# Patient Record
Sex: MALE | Race: WHITE | Employment: UNEMPLOYED | URBAN - METROPOLITAN AREA
[De-identification: names, ages, dates, MRNs, and addresses within clinical notes are randomized per-mention and may not be internally consistent; named-entity substitution may affect disease eponyms.]

---

## 2018-11-08 ENCOUNTER — HOSPITAL ENCOUNTER (INPATIENT)
Facility: CLINIC | Age: 53
LOS: 4 days | Discharge: HOME | End: 2018-11-12
Attending: EMERGENCY MEDICINE | Admitting: PSYCHIATRY & NEUROLOGY
Payer: COMMERCIAL

## 2018-11-08 DIAGNOSIS — F10.220 ACUTE ALCOHOLIC INTOXICATION IN ALCOHOLISM WITHOUT COMPLICATION (H): ICD-10-CM

## 2018-11-08 DIAGNOSIS — F11.10 OPIATE ABUSE, EPISODIC (H): ICD-10-CM

## 2018-11-08 PROBLEM — F10.10 ALCOHOL ABUSE: Status: ACTIVE | Noted: 2018-11-08

## 2018-11-08 LAB
ALCOHOL BREATH TEST: 0.24 (ref 0–0.01)
AMPHETAMINES UR QL SCN: NEGATIVE
BARBITURATES UR QL: NEGATIVE
BENZODIAZ UR QL: NEGATIVE
CANNABINOIDS UR QL SCN: POSITIVE
COCAINE UR QL: NEGATIVE
ETHANOL UR QL SCN: POSITIVE
GLUCOSE BLDC GLUCOMTR-MCNC: 85 MG/DL (ref 70–99)
OPIATES UR QL SCN: NEGATIVE

## 2018-11-08 PROCEDURE — 82075 ASSAY OF BREATH ETHANOL: CPT | Performed by: EMERGENCY MEDICINE

## 2018-11-08 PROCEDURE — 25000132 ZZH RX MED GY IP 250 OP 250 PS 637: Performed by: EMERGENCY MEDICINE

## 2018-11-08 PROCEDURE — 00000146 ZZHCL STATISTIC GLUCOSE BY METER IP

## 2018-11-08 PROCEDURE — HZ2ZZZZ DETOXIFICATION SERVICES FOR SUBSTANCE ABUSE TREATMENT: ICD-10-PCS | Performed by: PSYCHIATRY & NEUROLOGY

## 2018-11-08 PROCEDURE — 99285 EMERGENCY DEPT VISIT HI MDM: CPT | Performed by: EMERGENCY MEDICINE

## 2018-11-08 PROCEDURE — 80320 DRUG SCREEN QUANTALCOHOLS: CPT | Performed by: FAMILY MEDICINE

## 2018-11-08 PROCEDURE — 12800012 ZZH R&B CD MH INTERMEDIATE ADULT

## 2018-11-08 PROCEDURE — 99285 EMERGENCY DEPT VISIT HI MDM: CPT | Mod: Z6 | Performed by: EMERGENCY MEDICINE

## 2018-11-08 PROCEDURE — 80307 DRUG TEST PRSMV CHEM ANLYZR: CPT | Performed by: FAMILY MEDICINE

## 2018-11-08 RX ORDER — DIAZEPAM 5 MG
5-20 TABLET ORAL EVERY 30 MIN PRN
Status: DISCONTINUED | OUTPATIENT
Start: 2018-11-08 | End: 2018-11-12 | Stop reason: HOSPADM

## 2018-11-08 RX ORDER — HYDROXYZINE HYDROCHLORIDE 25 MG/1
25 TABLET, FILM COATED ORAL EVERY 4 HOURS PRN
Status: DISCONTINUED | OUTPATIENT
Start: 2018-11-08 | End: 2018-11-12 | Stop reason: HOSPADM

## 2018-11-08 RX ORDER — ACETAMINOPHEN 325 MG/1
650 TABLET ORAL EVERY 4 HOURS PRN
Status: DISCONTINUED | OUTPATIENT
Start: 2018-11-08 | End: 2018-11-12

## 2018-11-08 RX ORDER — LANOLIN ALCOHOL/MO/W.PET/CERES
100 CREAM (GRAM) TOPICAL ONCE
Status: COMPLETED | OUTPATIENT
Start: 2018-11-08 | End: 2018-11-08

## 2018-11-08 RX ORDER — ALUMINA, MAGNESIA, AND SIMETHICONE 2400; 2400; 240 MG/30ML; MG/30ML; MG/30ML
30 SUSPENSION ORAL EVERY 4 HOURS PRN
Status: DISCONTINUED | OUTPATIENT
Start: 2018-11-08 | End: 2018-11-12 | Stop reason: HOSPADM

## 2018-11-08 RX ORDER — BISACODYL 10 MG
10 SUPPOSITORY, RECTAL RECTAL DAILY PRN
Status: DISCONTINUED | OUTPATIENT
Start: 2018-11-08 | End: 2018-11-12 | Stop reason: HOSPADM

## 2018-11-08 RX ORDER — MAGNESIUM OXIDE 400 MG/1
800 TABLET ORAL ONCE
Status: COMPLETED | OUTPATIENT
Start: 2018-11-08 | End: 2018-11-08

## 2018-11-08 RX ORDER — MULTIVITAMIN,THERAPEUTIC
1 TABLET ORAL ONCE
Status: COMPLETED | OUTPATIENT
Start: 2018-11-08 | End: 2018-11-08

## 2018-11-08 RX ORDER — TRAZODONE HYDROCHLORIDE 50 MG/1
50 TABLET, FILM COATED ORAL
Status: DISCONTINUED | OUTPATIENT
Start: 2018-11-08 | End: 2018-11-12 | Stop reason: HOSPADM

## 2018-11-08 RX ORDER — FOLIC ACID 1 MG/1
1 TABLET ORAL ONCE
Status: COMPLETED | OUTPATIENT
Start: 2018-11-08 | End: 2018-11-08

## 2018-11-08 RX ADMIN — MAGNESIUM OXIDE TAB 400 MG (241.3 MG ELEMENTAL MG) 800 MG: 400 (241.3 MG) TAB at 18:52

## 2018-11-08 RX ADMIN — THERA TABS 1 TABLET: TAB at 18:52

## 2018-11-08 RX ADMIN — FOLIC ACID 1 MG: 1 TABLET ORAL at 18:52

## 2018-11-08 RX ADMIN — THIAMINE HCL TAB 100 MG 100 MG: 100 TAB at 18:52

## 2018-11-08 ASSESSMENT — ENCOUNTER SYMPTOMS
DIARRHEA: 0
COLOR CHANGE: 0
SORE THROAT: 0
NERVOUS/ANXIOUS: 1
VOMITING: 0
CONFUSION: 0
DIFFICULTY URINATING: 0
FEVER: 0
NAUSEA: 0
ABDOMINAL PAIN: 0
CHILLS: 0
HEADACHES: 0
EYE REDNESS: 0
SHORTNESS OF BREATH: 0

## 2018-11-08 NOTE — IP AVS SNAPSHOT
[facility]    [address] [place] [address]    Phone:  [phone]                                       After Visit Summary   11/8/2018    Esa Yoo    MRN: [id number]           After Visit Summary Signature Page     I have received my discharge instructions, and my questions have been answered. I have discussed any challenges I see with this plan with the nurse or doctor.    ..........................................................................................................................................  Patient/Patient Representative Signature      ..........................................................................................................................................  Patient Representative Print Name and Relationship to Patient    ..................................................               ................................................  Date                                   Time    ..........................................................................................................................................  Reviewed by Signature/Title    ...................................................              ..............................................  Date                                               Time          22EPIC Rev 08/18

## 2018-11-08 NOTE — IP AVS SNAPSHOT
MRN:[id number]                      After Visit Summary   11/8/2018    Esa Yoo    MRN: [id number]           Thank you!     Thank you for choosing [facility] for your care. Our goal is always to provide you with excellent care.        Patient Information     Date Of Birth          1965        Designated Caregiver       Most Recent Value    Caregiver    Will someone help with your care after discharge? no      About your hospital stay     You were admitted on:  November 8, 2018 You last received care in the:  [facility]    You were discharged on:  November 12, 2018       Who to Call     For medical emergencies, please call [phone].  For non-urgent questions about your medical care, please call your primary care provider or clinic, [phone]          Attending Provider     Provider Specialty    Jojo Pruett MD Emergency Medicine    Holli Victoria MD Psychiatry       Primary Care Provider Office Phone # Fax #    [facility] [phone]      Follow-up Appointments     Follow Up ([facility])       [facility], within 4-6 weeks for hospital follow- up.  The following labs/tests are recommended: TFTs, fasting lipids    Appointments on [place] and/or [facility] (with [facility] or [facility] provider or service). Call [phone] if you haven't heard regarding these appointments within 7 days of discharge.                  Further instructions from your care team       Behavioral Discharge Planning and Instructions  THANK YOU FOR CHOOSING THE [address]  [phone]    Summary: You were admitted to [facility] on 11/8/18 for detoxification from Alcohol.  A medical exam was performed that included lab work. You have met with a  and opted to pursue OP treatment in [facility].  You are scheduled for an intake assessment with  [facility] at the time and place indicated below.  Please take care and make your recovery a priority!      [facility],  [address] [place] [address].    November 13th @ 1:00 pm.    [phone]    Main Diagnosis:  Per  Dr. Victoria  Alcohol use disorder, severe.   Major depressive disorder, recurrent with psychotic features.           Major Treatments, Procedures and Findings:  You were detoxed from alcohol. You have met with a  to develop a treatment plan for discharge.  You have had labs drawn and a copy of those labs will be sent home with you. Your alcohol withdrawal is complete and you are being discharged today.  Please bring your lab results with to your follow up doctor appointment.  Make your recovery a priority!                        Symptoms to Report:  If you experience more anxiety, confusion, sleeplessness, deep sadness or thoughts of suicide, notify your treatment team or notify your primary care physician. IF ANY OF THE SYMPTOMS YOU ARE EXPERIENCING ARE A MEDICAL EMERGENCY CALL 911 IMMEDIATELY.     Lifestyle Adjustment: Adjust your lifestyle to get enough sleep, relaxation, exercise and  good nutrition. Continue to develop healthy coping skills to decrease stress and promote a sober living environment. Do not use alcohol, illegal drugs or addictive medications other than what is currently prescribed. AA, NA, and  Sponsor are excellent resources for support.     Disposition: [facility], [address] [place] [address].  November 13th @ 1:00 pm.  [phone]    Primary Provider:  Dr. Leonardo  [facility]  Address: [address], [place] [address]  Phone: [phone]    Appointment:  11/14/18 at 2:20pm    Resources:     [facility] [phone]    Support Group:  AA/NA and Sponsor/support    Crisis Intervention: 136.903.9764 or  684.464.1924 (TTY: 539.923.3422).  Call anytime for help.  National [place] on Mental Illness (www.[place].shirley.org): 353.319.4178 or 936-842-8361.  Alcoholics Anonymous (www.alcoholics-anonymous.org): Check your phone book for your local chapter.  Suicide Awareness Voices of Education (SAVE) (www.save.org): 719-069-UBRK (8447)  National Suicide Prevention Line (www.mentalhealthmn.org): 163-598-GTEF (1649)  Mental Health Consumer/Survivor Network of [place] (www.mhcsn.net): 530.592.9552 or 866-083-7540  Mental Health Association of [place] (www.mentalhealth.org): 874.270.2197 or 143-283-0022   Substance Abuse and Mental Health Services (www.samhsa.gov)    [facility] ([facility])  [facility] connects people seeking recovery to resources that help foster and sustain long-term recovery.  Whether you are seeking resources for treatment, transportation, housing, job training, education, health care or other pathways to recovery, [facility] is a great place to start.  902.314.1095.  Www.[facility].org    General Medication Instructions:   See your medication sheet(s) for instructions.   Take all medicines as directed.  Make no changes unless your doctor suggests them.   Go to all your doctor visits.  Be sure to have all your required lab tests. This way, your medicines can be refilled on time.  Do not use any drugs not prescribed by your provider.  AA/NA and Sponsors are excellent resources for support  Avoid alcohol.    Please Note:  If you have any questions at anytime after you are discharged please call the [facility] [place] detox unit 3AW unit at [phone].    [facility], Behavioral Intake [phone]    Please take this discharge folder with you to all your follow up appointments, it contains your lab results, diagnosis, medication list and discharge recommendations.      THANK YOU FOR CHOOSING THE [facility]     Pending Results     Date and Time  "Order Name Status Description    2018 0812 Hepatitis C antibody In process     2018 0812 Hepatitis B surface antigen In process     2018 0812 Hepatitis B Surface Antibody In process     2018 0810 T4 free In process             Statement of Approval     Ordered          18 0747  I have reviewed and agree with all the recommendations and orders detailed in this document.  EFFECTIVE NOW     Comments:  Please ask medcine about lft and simvastatin   Approved and electronically signed by:  Holli Victoria MD             Admission Information     Date & Time Provider Department Dept. Phone    2018 Holli Victoria MD [facility] [phone]      Your Vitals Were     Blood Pressure Pulse Temperature Respirations Height Weight    115/76 79 97.5  F (36.4  C) (Oral) 16 1.778 m (5' 10\") 99.2 kg (218 lb 9.6 oz)    Pulse Oximetry BMI (Body Mass Index)                96% 31.37 kg/m2          [facility] Information     [facility] lets you send messages to your doctor, view your test results, renew your prescriptions, schedule appointments and more. To sign up, go to [email or web address] . Click on \"Log in\" on the left side of the screen, which will take you to the Welcome page. Then click on \"Sign up Now\" on the right side of the page.     You will be asked to enter the access code listed below, as well as some personal information. Please follow the directions to create your username and password.     Your access code is: [email or web address]  Expires: 2/10/2019  9:16 AM     Your access code will  in 90 days. If you need help or a new code, please call your [facility] clinic or [phone].        Care EveryWhere ID     This is your Care EveryWhere ID. This could be used by other organizations to access your [facility] medical records  [id number]        Equal Access to Services     ALEX FRASER AH: Terrie Rod, james guillory, marco a saavedra, " mindy morgan haynathalyn napoleon khjesssh la'aan ah. So [facility] [phone].    ATENCIÓN: Si habla margiañol, tiene a smith disposición servicios gratuitos de asistencia lingüística. Llcatie al [phone].    We comply with applicable federal civil rights laws and [place] laws. We do not discriminate on the basis of race, color, national origin, age, disability, sex, sexual orientation, or gender identity.               Review of your medicines      START taking        Dose / Directions    fluticasone 50 MCG/ACT spray   Commonly known as:  FLONASE   Used for:  Acute alcoholic intoxication in alcoholism without complication (H)        Dose:  1 spray   Spray 1 spray into both nostrils daily   Quantity:  1 Bottle   Refills:  0       hydrOXYzine 25 MG tablet   Commonly known as:  ATARAX   Used for:  Acute alcoholic intoxication in alcoholism without complication (H)        Dose:  25 mg   Take 1 tablet (25 mg) by mouth every 4 hours as needed for anxiety   Quantity:  120 tablet   Refills:  0       lisinopril-hydrochlorothiazide 10-12.5 MG per tablet   Commonly known as:  PRINZIDE/ZESTORETIC   Used for:  Acute alcoholic intoxication in alcoholism without complication (H)        Dose:  1 tablet   Take 1 tablet by mouth daily   Quantity:  30 tablet   Refills:  0       multivitamin, therapeutic Tabs tablet   Used for:  Acute alcoholic intoxication in alcoholism without complication (H)        Dose:  1 tablet   Take 1 tablet by mouth daily   Quantity:  30 tablet   Refills:  0       nicotine 21 MG/24HR 24 hr patch   Commonly known as:  NICODERM CQ   Used for:  Acute alcoholic intoxication in alcoholism without complication (H)        Dose:  1 patch   Place 1 patch onto the skin daily   Quantity:  30 patch   Refills:  1       thiamine 100 MG tablet   Used for:  Acute alcoholic intoxication in alcoholism without complication (H)        Dose:  100 mg   Take 1 tablet (100 mg) by mouth daily   Quantity:  30 tablet   Refills:  0            Where  to get your medicines      These medications were sent to [facility] [place], [place] - [address]  [address] [facility], [facility] [address]     Phone:  [phone]     fluticasone 50 MCG/ACT spray    hydrOXYzine 25 MG tablet    lisinopril-hydrochlorothiazide 10-12.5 MG per tablet    multivitamin, therapeutic Tabs tablet    nicotine 21 MG/24HR 24 hr patch    thiamine 100 MG tablet                Protect others around you: Learn how to safely use, store and throw away your medicines at www.disposemymeds.org.             Medication List: This is a list of all your medications and when to take them. Check marks below indicate your daily home schedule. Keep this list as a reference.      Medications           Morning Afternoon Evening Bedtime As Needed    fluticasone 50 MCG/ACT spray   Commonly known as:  FLONASE   Spray 1 spray into both nostrils daily   Last time this was given:  1 spray on 11/12/2018  9:16 AM                                   hydrOXYzine 25 MG tablet   Commonly known as:  ATARAX   Take 1 tablet (25 mg) by mouth every 4 hours as needed for anxiety   Last time this was given:  25 mg on 11/12/2018 12:13 PM                                   lisinopril-hydrochlorothiazide 10-12.5 MG per tablet   Commonly known as:  PRINZIDE/ZESTORETIC   Take 1 tablet by mouth daily   Last time this was given:  1 tablet on 11/12/2018  9:09 AM                                   multivitamin, therapeutic Tabs tablet   Take 1 tablet by mouth daily   Last time this was given:  1 tablet on 11/12/2018  9:08 AM                                   nicotine 21 MG/24HR 24 hr patch   Commonly known as:  NICODERM CQ   Place 1 patch onto the skin daily   Last time this was given:  1 patch on 11/12/2018  9:09 AM                                   thiamine 100 MG tablet   Take 1 tablet (100 mg) by mouth daily   Last time this was given:  100 mg on 11/12/2018  9:09 AM

## 2018-11-09 ENCOUNTER — APPOINTMENT (OUTPATIENT)
Dept: ULTRASOUND IMAGING | Facility: CLINIC | Age: 53
End: 2018-11-09
Attending: PHYSICIAN ASSISTANT
Payer: COMMERCIAL

## 2018-11-09 LAB
ALBUMIN SERPL-MCNC: 3.8 G/DL (ref 3.4–5)
ALP SERPL-CCNC: 72 U/L (ref 40–150)
ALT SERPL W P-5'-P-CCNC: 137 U/L (ref 0–70)
ANION GAP SERPL CALCULATED.3IONS-SCNC: 7 MMOL/L (ref 3–14)
AST SERPL W P-5'-P-CCNC: 133 U/L (ref 0–45)
BASOPHILS # BLD AUTO: 0 10E9/L (ref 0–0.2)
BASOPHILS NFR BLD AUTO: 0.4 %
BILIRUB SERPL-MCNC: 0.9 MG/DL (ref 0.2–1.3)
BUN SERPL-MCNC: 12 MG/DL (ref 7–30)
CALCIUM SERPL-MCNC: 8.7 MG/DL (ref 8.5–10.1)
CHLORIDE SERPL-SCNC: 106 MMOL/L (ref 94–109)
CHOLEST SERPL-MCNC: 236 MG/DL
CO2 SERPL-SCNC: 28 MMOL/L (ref 20–32)
CREAT SERPL-MCNC: 0.86 MG/DL (ref 0.66–1.25)
DIFFERENTIAL METHOD BLD: ABNORMAL
EOSINOPHIL # BLD AUTO: 0.1 10E9/L (ref 0–0.7)
EOSINOPHIL NFR BLD AUTO: 1 %
ERYTHROCYTE [DISTWIDTH] IN BLOOD BY AUTOMATED COUNT: 14.3 % (ref 10–15)
GFR SERPL CREATININE-BSD FRML MDRD: >90 ML/MIN/1.7M2
GLUCOSE SERPL-MCNC: 82 MG/DL (ref 70–99)
HCT VFR BLD AUTO: 46.7 % (ref 40–53)
HDLC SERPL-MCNC: 107 MG/DL
HGB BLD-MCNC: 15.7 G/DL (ref 13.3–17.7)
IMM GRANULOCYTES # BLD: 0 10E9/L (ref 0–0.4)
IMM GRANULOCYTES NFR BLD: 0.3 %
LDLC SERPL CALC-MCNC: 108 MG/DL
LYMPHOCYTES # BLD AUTO: 3.5 10E9/L (ref 0.8–5.3)
LYMPHOCYTES NFR BLD AUTO: 33.3 %
MCH RBC QN AUTO: 34.5 PG (ref 26.5–33)
MCHC RBC AUTO-ENTMCNC: 33.6 G/DL (ref 31.5–36.5)
MCV RBC AUTO: 103 FL (ref 78–100)
MONOCYTES # BLD AUTO: 1 10E9/L (ref 0–1.3)
MONOCYTES NFR BLD AUTO: 9.7 %
NEUTROPHILS # BLD AUTO: 5.8 10E9/L (ref 1.6–8.3)
NEUTROPHILS NFR BLD AUTO: 55.3 %
NONHDLC SERPL-MCNC: 129 MG/DL
NRBC # BLD AUTO: 0 10*3/UL
NRBC BLD AUTO-RTO: 0 /100
PLATELET # BLD AUTO: 186 10E9/L (ref 150–450)
POTASSIUM SERPL-SCNC: 4.2 MMOL/L (ref 3.4–5.3)
PROT SERPL-MCNC: 7.2 G/DL (ref 6.8–8.8)
RBC # BLD AUTO: 4.55 10E12/L (ref 4.4–5.9)
SODIUM SERPL-SCNC: 141 MMOL/L (ref 133–144)
T4 FREE SERPL-MCNC: 0.95 NG/DL (ref 0.76–1.46)
TRIGL SERPL-MCNC: 105 MG/DL
TSH SERPL DL<=0.005 MIU/L-ACNC: 4.1 MU/L (ref 0.4–4)
WBC # BLD AUTO: 10.5 10E9/L (ref 4–11)

## 2018-11-09 PROCEDURE — 99222 1ST HOSP IP/OBS MODERATE 55: CPT | Performed by: PHYSICIAN ASSISTANT

## 2018-11-09 PROCEDURE — 12800012 ZZH R&B CD MH INTERMEDIATE ADULT

## 2018-11-09 PROCEDURE — 99207 ZZC CONSULT E&M CHANGED TO INITIAL LEVEL: CPT | Performed by: PHYSICIAN ASSISTANT

## 2018-11-09 PROCEDURE — 36415 COLL VENOUS BLD VENIPUNCTURE: CPT | Performed by: PSYCHIATRY & NEUROLOGY

## 2018-11-09 PROCEDURE — 25000132 ZZH RX MED GY IP 250 OP 250 PS 637: Performed by: PSYCHIATRY & NEUROLOGY

## 2018-11-09 PROCEDURE — 84443 ASSAY THYROID STIM HORMONE: CPT | Performed by: PSYCHIATRY & NEUROLOGY

## 2018-11-09 PROCEDURE — 93971 EXTREMITY STUDY: CPT | Mod: LT

## 2018-11-09 PROCEDURE — 80053 COMPREHEN METABOLIC PANEL: CPT | Performed by: PSYCHIATRY & NEUROLOGY

## 2018-11-09 PROCEDURE — 85025 COMPLETE CBC W/AUTO DIFF WBC: CPT | Performed by: PSYCHIATRY & NEUROLOGY

## 2018-11-09 PROCEDURE — 80061 LIPID PANEL: CPT | Performed by: PSYCHIATRY & NEUROLOGY

## 2018-11-09 PROCEDURE — 25000132 ZZH RX MED GY IP 250 OP 250 PS 637: Performed by: NURSE PRACTITIONER

## 2018-11-09 PROCEDURE — 99221 1ST HOSP IP/OBS SF/LOW 40: CPT | Mod: AI | Performed by: PSYCHIATRY & NEUROLOGY

## 2018-11-09 PROCEDURE — 84439 ASSAY OF FREE THYROXINE: CPT | Performed by: PSYCHIATRY & NEUROLOGY

## 2018-11-09 RX ORDER — MULTIVITAMIN,THERAPEUTIC
1 TABLET ORAL DAILY
Status: DISCONTINUED | OUTPATIENT
Start: 2018-11-09 | End: 2018-11-12 | Stop reason: HOSPADM

## 2018-11-09 RX ORDER — LANOLIN ALCOHOL/MO/W.PET/CERES
100 CREAM (GRAM) TOPICAL DAILY
Status: DISCONTINUED | OUTPATIENT
Start: 2018-11-09 | End: 2018-11-12 | Stop reason: HOSPADM

## 2018-11-09 RX ORDER — LISINOPRIL/HYDROCHLOROTHIAZIDE 10-12.5 MG
1 TABLET ORAL DAILY
Status: DISCONTINUED | OUTPATIENT
Start: 2018-11-09 | End: 2018-11-12 | Stop reason: HOSPADM

## 2018-11-09 RX ORDER — FLUTICASONE PROPIONATE 50 MCG
1 SPRAY, SUSPENSION (ML) NASAL DAILY
Status: DISCONTINUED | OUTPATIENT
Start: 2018-11-09 | End: 2018-11-12 | Stop reason: HOSPADM

## 2018-11-09 RX ORDER — ATORVASTATIN CALCIUM 40 MG/1
40 TABLET, FILM COATED ORAL EVERY EVENING
Status: DISCONTINUED | OUTPATIENT
Start: 2018-11-09 | End: 2018-11-12

## 2018-11-09 RX ORDER — FOLIC ACID 1 MG/1
1 TABLET ORAL DAILY
Status: DISCONTINUED | OUTPATIENT
Start: 2018-11-09 | End: 2018-11-12 | Stop reason: HOSPADM

## 2018-11-09 RX ADMIN — ATORVASTATIN CALCIUM 40 MG: 40 TABLET, FILM COATED ORAL at 20:36

## 2018-11-09 RX ADMIN — FLUTICASONE PROPIONATE 1 SPRAY: 50 SPRAY, METERED NASAL at 14:26

## 2018-11-09 RX ADMIN — FOLIC ACID 1 MG: 1 TABLET ORAL at 13:20

## 2018-11-09 RX ADMIN — THERA TABS 1 TABLET: TAB at 13:20

## 2018-11-09 RX ADMIN — DIAZEPAM 10 MG: 5 TABLET ORAL at 20:36

## 2018-11-09 RX ADMIN — NICOTINE 1 PATCH: 7 PATCH TRANSDERMAL at 11:02

## 2018-11-09 RX ADMIN — Medication 100 MG: at 13:20

## 2018-11-09 RX ADMIN — DIAZEPAM 10 MG: 5 TABLET ORAL at 00:36

## 2018-11-09 RX ADMIN — DIAZEPAM 10 MG: 5 TABLET ORAL at 13:20

## 2018-11-09 RX ADMIN — DIAZEPAM 5 MG: 5 TABLET ORAL at 16:16

## 2018-11-09 RX ADMIN — DIAZEPAM 10 MG: 5 TABLET ORAL at 11:02

## 2018-11-09 RX ADMIN — DIAZEPAM 10 MG: 5 TABLET ORAL at 09:41

## 2018-11-09 RX ADMIN — LISINOPRIL AND HYDROCHLOROTHIAZIDE 1 TABLET: 12.5; 1 TABLET ORAL at 14:27

## 2018-11-09 RX ADMIN — DIAZEPAM 10 MG: 5 TABLET ORAL at 04:52

## 2018-11-09 ASSESSMENT — ACTIVITIES OF DAILY LIVING (ADL)
DRESS: INDEPENDENT
ORAL_HYGIENE: INDEPENDENT
GROOMING: SHOWER;INDEPENDENT
LAUNDRY: WITH SUPERVISION

## 2018-11-09 NOTE — PROGRESS NOTES
Writer met with pt to discuss discharge planning. At this time he is undecided about treatment. Writer encouraged him to complete the paperwork and we discussed LP. He has had two prior treatments at [facility] in 2014 and 2016.     Pt has Blue Plus MA.    He lives with his GF who is on suboxone. He owns his home but finances are tough right now. He plans on selling his home and moving in with his son in [place]. Son smokes pot at times and drinks occasionally. 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 He has not worked for a year and a half.

## 2018-11-09 NOTE — PLAN OF CARE
Problem: Substance Withdrawal  Goal: Substance Withdrawal  Signs and symptoms of listed problems will be absent or manageable.   Outcome: No Change  Pt being monitored for alcohol withdrawal. Pt medicated x 3 with valium 10 mg. Pt has +hand/arm tremors. No oversedation noted. Pt out on unit. Showered. See order for pt to use c pap machine. Pt called GF and got name and doses of his home medications which were ordered by Dr. Victoria.   Pt went down for US of leg related to edema. Ticket to ride and code two ordered obtained. Pt reports mood as anxious. To have a hepatic panel in am. Discharge plans pending.

## 2018-11-09 NOTE — PHARMACY-ADMISSION MEDICATION HISTORY
"Admission Medication History status for the 11/8/2018 admission is complete.  See EPIC admission navigator for Prior to Admission medications.    Medication history interview sources:  Patient and Spouse     Medication history source reliability: Good    Patient reports taking no prescription or over-the-counter medications, reports was prescribed a cholesterol med and anti-depressant but hasn't taken anything \"for months\".     Has not received a flu shot.     Time spent in this activity: 5 minutes    Medication history completed by: HIRAM Betts    Prior to Admission medications    Not on File         "

## 2018-11-09 NOTE — PLAN OF CARE
Problem: Patient Care Overview  Goal: Team Discussion  Team Plan:   BEHAVIORAL TEAM DISCUSSION    Participants: Dr. Victoria, 3a  and 3a nurses  Progress: new admission  Continued Stay Criteria/Rationale: pt is withdrawing from alcohol-on the MSSA scale for withdrawal  Medical/Physical: withdrawing, receiving valium   Precautions:   Behavioral Orders   Procedures     Code 1 - Restrict to Unit     Routine Programming     As clinically indicated     Status 15     Every 15 minutes.     Withdrawal precautions     Plan: pt will complete paperwork and decide on treatment this weekend.  Rationale for change in precautions or plan: no change

## 2018-11-09 NOTE — H&P
Admitted:     11/08/2018      IDENTIFYING INFORMATION:  The patient is a 52-year-old  male.  He is a banker.  He is single, currently unemployed.  He has a girlfriend and has 2 children.      CHIEF COMPLAINT:  Alcohol.      HISTORY OF PRESENT ILLNESS:  The patient started drinking in late May.  He has chronic alcoholism.  He started in his late 20s.  He went to [facility] in 2014 and was sober through 2016.  In 2016, he was sober until May of this year.  He has been drinking daily.  He has been drinking half to 3/4 liters   of tequila.  He has tolerance, withdrawal, and progressive use.  He spends more time, more amount used despite negative consequences, money, job, relationships.  He is using marijuana off and on.  He told the emergency room doctor and the admitting nurse that he was using opiates, but he denies using opiates right now that he just was told by his friend to tell that he was using opiates to make sure he gets in, but he does not use any opiates.  He smokes cigars.  He does not de la garza.  He does have depression.  He agrees to take Wellbutrin.  He did have depression when he was sober.  When he gets depressed, his sleep goes down, energy goes down, motivation goes down, interest goes down, and he isolates.  He does not have suicidal ideation.  Does not have auditory hallucinations.      PAST PSYCHIATRIC HISTORY:  .  Last hospitalization was in [facility].      REVIEW OF SYSTEMS:  A 10-point system was reviewed and is negative.  He is very tremulous.  His vitals show a temperature of 99, pulse of 90, respiration rate of 16, blood pressure of 144/94.  The patient has increased lipids.      FAMILY HISTORY:  Maternal grandmother has alcoholism.  Dad has depression.  Paternal uncle committed suicide by carbon monoxide poisoning.      SOCIAL HISTORY:  Born in [place].  Good childhood up until age 14 when parents , and he was left alone.  He began to drink.  He was a single dad for a while  and after his daughter left, he began to drink more.      MENTAL STATUS EXAMINATION:  The patient is a 52-year-old  male who appears his stated age.  Adequate grooming, adequate hygiene.  Maintains good eye contact.  Cooperative.  No psychomotor abnormalities; however, he is tremulous.  Gait is normal.  Mood is tired.  Affect is congruent.  Speech is spontaneous, normal rate.    Does not have any active suicidal ideation.  Denied auditory hallucinations.  Alert and oriented x 3.  No loose associations.      DIAGNOSES:   1.  Alcohol use disorder, severe.   2.  Major depressive disorder, recurrent with psychotic features.      PLAN:  The patient will be detoxed off alcohol using [facility] protocol on Valium.  The patient will be started on Wellbutrin after detox is done.  The patient is ambivalent what he wants to do in terms of treatment.  The patient will be seen by case management and Internal Medicine.         KY HOUSER MD             D: 2018   T: 2018   MT:       Name:     YOMAIRA VERA   MRN:      [id number]        Account:      [id number]   :      1965        Admitted:     2018                   Document: [id number]

## 2018-11-09 NOTE — CONSULTS
"  [facility]  Internal Medicine Consult     Esa Yoo MRN# [id number]   Age: 52 year old YOB: 1965     Date of Admission: 11/8/2018  Date of Consult:  11/9/2018    Primary Care Provider: Cari Cheema    Requesting Service: Dr. Victoria  Reason for Consult: General Medical Evaluation      SUBJECTIVE   CC:   Elevated BP   Assessment and Plan/Recommendations:   Esa Yoo is a 52 year old male with history of depression, anxiety, and substance abuse who was admitted to station 3A with acute etoh withdrawal    Substance abuse with acute etoh withdrawal: Drinking 375 to 400 ml vodka tequila daily. Also using marijuana and opiates   - Management per psych     Elevated blood pressure: BP 120s-150s/80s-90s in the setting of acute withdrawal  - Hydralazine prn for SBP>175 or DBP>110  - Contact medicine if BP spikes >175/>110 or is consistently >150s/>90s upon completion of detox    Transaminitis: , . Tbili and ALP normal. LFTs normal 5/2018 at [facility]. Reports intermittent RUQ pain. No tenderness on exam. Likely 2/2 etoh although not in classic 2:1 ratio  - Trend LFTs 11/10 given new elevation, not 2:1    LLE edema: Present for \"Several months.\" Denies prior US. Concerning for DVT   - LLE US    Abnormal TFTs: TSH 4.1, T4 normal. Likely sick euthyroid vs subclinical hypothyroidism. Recheck with PCP in 6 weeks    Hypercholesteremia: Ordered per psych. Discussed lifestyle modifications and follow up with PCP     ALINE: Continue CPAP    Medicine will continue to peripherally follow. Please contact if future questions or concerns arise. Thank you for the opportunity to be a part of this patient's care.      Katelin Shetty  Internal Medicine [facility] Hospitalist  [phone]  November 9, 2018         HPI:   Esa Yoo is a 52 year old male with history of depression, anxiety, and substance abuse who was admitted to station 3A " "with acute etoh withdrawal    Patient currently reports feeling very shaky. He is constipated but thinks he will probably have a BM today. He is tolerating a diet. He reports intermittent RUQ tenderness but is unsure if present at this time. Denies icterus, jaundice, fevers, confusion. Patient stopped following with his PCP a few months back for unclear reasons. He is agreeable to OP follow up going forward. Denies recent illness, headache, confusion, acute visual changes, dizziness, chest pain, dyspnea, cough, urinary symptoms, peripheral edema, new onset joint pain, or rash       Past Medical History:     Past Medical History:   Diagnosis Date     Anxiety      Depressive disorder      Hypertension      Polysubstance abuse (H)      Sleep apnea         Reviewed and updated in [facility].     Past Surgical History:      Past Surgical History:   Procedure Laterality Date     HERNIA REPAIR           Social History:   Tobacco use: 5 cigars/day  Alcohol use: As above  Elicit drug use: As above     Family History:     Family History   Problem Relation Age of Onset     Hypertension Mother      Coronary Artery Disease Father      Cancer Father          Allergies:   No Known Allergies      Medications:   Reviewed. Please see MAR     Review of Systems:   10 point ROS of systems including Constitutional, Eyes, Respiratory, Cardiovascular, Gastroenterology, Genitourinary, Integumentary, Muscularskeletal, Psychiatric were all negative except for pertinent positives noted in my HPI.    OBJECTIVE   Physical Exam:   Vitals were reviewed  Blood pressure (!) 144/94, pulse 90, temperature 99  F (37.2  C), temperature source Oral, resp. rate 16, height 1.778 m (5' 10\"), weight 99.2 kg (218 lb 9.6 oz), SpO2 96 %.  General: Alert, NAD, appears anxious. Shaky  HEENT: Anicteric sclera, EOMI, membranes moist  Cardiovascular: RRR, S1S2. No murmur noted  Lungs: CTAB without wheezing or crackles   GI: Abdomen soft, non-tender with bowel sounds " present. No guarding or rebound present  Vascular: LLE 2+ peripheral edema to the mid shins. No RLE edema, distal pulses palpable  Neurologic: AAO X 3, no focal deficits  Neuropsychiatric: Per psych  Skin: No jaundice, rashes, or lesions        Data:        No results found for: NA No results found for: CHLORIDE No results found for: BUN   No results found for: POTASSIUM No results found for: CO2 No results found for: CR     Lab Results   Component Value Date    WBC 10.5 11/09/2018    HGB 15.7 11/09/2018    HCT 46.7 11/09/2018     (H) 11/09/2018     11/09/2018     Lab Results   Component Value Date    WBC 10.5 11/09/2018

## 2018-11-09 NOTE — PLAN OF CARE
"Problem: Substance Withdrawal  Goal: Substance Withdrawal  Signs and symptoms of listed problems will be absent or manageable.     ADMIT: this 51 yo arrived tonight around 2100. Pt reports drinking 1/2 to 3/4 of a 750 ml bottle of tequila daily since last May when he went to visit his mother in [place].  Last reported drink was just prior to arrival in the ED.  Breathalyzer .237.  No reported hx of DTs or seizures.  Pt also reports smoking marijuana daily.  Pt reports using 1 to 2 10 mg tablets of oxy daily but admits this was probably less and it wouldn't be an issue. \"Alcohol is my biggest problem\"!  Last reported use was yesterday.  No reported withdrawal symptoms at this time.  Pt reports passive SI but no plan or intent. Pt states because he hasn't worked and is just about \"broke\" so he has some mild depression because of his situation and issues. Pt denies SI now and states he would like to detox and then possibly [place] again as he has been there twice for treatment in the past. Pt has settled well on the unit at this time.  MSSA 4 at 2130. Opiate score 3 but pt states he does not abuse oxy.         "

## 2018-11-09 NOTE — PROGRESS NOTES
Contact medicine if BP spikes >175/>110 or is consistently >150s/>90s upon completion of detox      Katelin Shetty PA-C     Per Medical

## 2018-11-09 NOTE — ED NOTES
ED to Behavioral Floor Handoff    SITUATION  Esa Yoo is a 52 year old male who speaks English and lives in a home with erica. The patient arrived in the ED by private car from home with a complaint of Addiction Problem (Seeking detox from alchohol and opiates. Last use today.)  .The patient's current symptoms started/worsened 1 month(s) ago and during this time the symptoms have increased.   In the ED, pt was diagnosed with   Final diagnoses:   Acute alcoholic intoxication in alcoholism without complication (H)   Opiate abuse, episodic (H)        Initial vitals were: BP: 126/80  Pulse: 93  Temp: 97.4  F (36.3  C)  Resp: 16  Weight: 99.2 kg (218 lb 9.6 oz)  SpO2: 96 %   --------  Is the patient diabetic? No   If yes, last blood glucose? --     If yes, was this treated in the ED? --  --------  Is the patient inebriated (ETOH) Yes or Impaired on other substances? No  MSSA done? Yes  Last MSSA score: --    Were withdrawal symptoms treated? Yes  Does the patient have a seizure history? No. If yes, date of most recent seizure--  --------  Is the patient patient experiencing suicidal ideation? denies current or recent suicidal ideation     Homicidal ideation? denies current or recent homicidal ideation or behaviors.    Self-injurious behavior/urges? denies current or recent self injurious behavior or ideation.  ------  Was pt aggressive in the ED No  Was a code called No  Is the pt now cooperative? Yes  -------  Meds given in ED:   Medications   multivitamin, therapeutic (THERA-VIT) tablet 1 tablet (1 tablet Oral Given 11/8/18 1852)   folic acid (FOLVITE) tablet 1 mg (1 mg Oral Given 11/8/18 1852)   thiamine tablet 100 mg (100 mg Oral Given 11/8/18 1852)   magnesium oxide (MAG-OX) tablet 800 mg (800 mg Oral Given 11/8/18 1852)      Family present during ED course? Yes  Family currently present? Yes    BACKGROUND  Does the patient have a cognitive impairment or developmental disability? No  Allergies: No  Known Allergies.   Social demographics are   Social History     Social History     Marital status:      Spouse name: N/A     Number of children: N/A     Years of education: N/A     Social History Main Topics     Smoking status: Current Every Day Smoker     Smokeless tobacco: Never Used     Alcohol use Yes      Comment: 750 ml Tequila/day     Drug use: Yes     Special: Opiates      Comment: oxy     Sexual activity: Not Asked     Other Topics Concern     None     Social History Narrative     None        ASSESSMENT  Labs results   Labs Ordered and Resulted from Time of ED Arrival Up to the Time of Departure from the ED   DRUG ABUSE SCREEN 6 CHEM DEP URINE ([facility]) - Abnormal; Notable for the following:        Result Value    Cannabinoids Qual Urine Positive (*)     Ethanol Qual Urine Positive (*)     All other components within normal limits   ALCOHOL BREATH TEST POCT - Abnormal; Notable for the following:     Alcohol Breath Test 0.237 (*)     All other components within normal limits   GLUCOSE MONITOR NURSING POCT   GLUCOSE BY METER      Imaging Studies: No results found for this or any previous visit (from the past 24 hour(s)).   Most recent vital signs /80  Pulse 93  Temp 97.4  F (36.3  C) (Oral)  Resp 16  Wt 99.2 kg (218 lb 9.6 oz)  SpO2 96%   Abnormal labs/tests/findings requiring intervention:---   Pain control: fair  Nausea control: fair    RECOMMENDATION  Are any infection precautions needed (MRSA, VRE, etc.)? No If yes, what infection? --  ---  Does the patient have mobility issues? independently. If yes, what device does the pt use? ---  ---  Is patient on 72 hour hold or commitment? No If on 72 hour hold, have hold and rights been given to patient? Yes  Are admitting orders written if after 10 p.m. N/A  Tasks needing to be completed:---     Audrey Gurrola   [facility]-- [address]   [phone] West ED   [phone] East ED

## 2018-11-09 NOTE — ED PROVIDER NOTES
"  History     Chief Complaint   Patient presents with     Addiction Problem     Seeking detox from alchohol and opiates. Last use today.     The history is provided by the patient and the spouse.     Esa Yoo is a 52 year old male who presents to the emergency department today requesting detox.  Patient states that he has been drinking 1/2-3/4 of a 750 mL bottle of tequila daily since May.  Last use was just prior to arrival in the emergency department.  The patient reports that he has never had an alcohol withdrawal seizure to his knowledge.  He does endorse withdrawal symptoms including shakes, sweats, severe anxiety and panic attacks, diarrhea, as well as nausea and vomiting.    In the past 3 months he has been abusing opiates.  He is taking 1 or 2 10 mg tablets of oxycodone every day to every other day.  Last use of opiates was yesterday.    He also admits to smoking marijuana on a daily basis for 20 years.  He states he is unable to quantify how much he uses.    He has been in treatment in [facility] in 2014 and 2016.  He endorses depression and anxiety, states that previously he was treated for this through his clinic until \"my doctor  \".  This was in 2016.  He has been very inconsistent with taking his prescribed Prozac since that time.  He denies any current SI but does endorse passive SI.    I have reviewed the Medications, Allergies, Past Medical and Surgical History, and Social History in the Epic system.    Review of Systems   Constitutional: Negative for chills and fever.   HENT: Negative for congestion and sore throat.    Eyes: Negative for redness.   Respiratory: Negative for shortness of breath.    Cardiovascular: Negative for chest pain.   Gastrointestinal: Negative for abdominal pain, diarrhea, nausea and vomiting.   Genitourinary: Negative for difficulty urinating.   Skin: Negative for color change.   Neurological: Negative for headaches.   Psychiatric/Behavioral: Positive for " suicidal ideas (passive SI, no active SI, no plan). Negative for confusion. The patient is nervous/anxious.    All other systems reviewed and are negative.      Physical Exam   BP: 126/80  Pulse: 93  Temp: 97.4  F (36.3  C)  Resp: 16  Weight: 99.2 kg (218 lb 9.6 oz)  SpO2: 96 %      Physical Exam   Constitutional: No distress.   Middle aged male, lying back in bed, sleeping.  Arousable. NAD   HENT:   Head: Normocephalic and atraumatic.   Mouth/Throat: Oropharynx is clear and moist. No oropharyngeal exudate.   Eyes: No scleral icterus.   Cardiovascular: Normal heart sounds and intact distal pulses.    Pulmonary/Chest: Breath sounds normal. No respiratory distress.   Abdominal: Soft. Bowel sounds are normal. He exhibits no distension. There is no tenderness. There is no rebound.   Obese, soft, nontender.  Ventral hernia noted, soft, nontender.    Musculoskeletal: He exhibits no edema or tenderness.   Skin: Skin is warm. No rash noted. He is not diaphoretic.   Psychiatric:   Disheveled.  Intoxicated. Cooperative, fair eye contact. Slightly delayed answers to questions.  Depressed affect. Passive SI without active plan.  No AH/VH.    Nursing note and vitals reviewed.      ED Course     ED Course     Procedures             Critical Care time:  none             Results for orders placed or performed during the hospital encounter of 11/08/18 (from the past 48 hour(s))   Alcohol breath test POCT   Result Value Ref Range    Alcohol Breath Test 0.237 (A) 0.00 - 0.01   Glucose by meter   Result Value Ref Range    Glucose 85 70 - 99 mg/dL              Assessments & Plan (with Medical Decision Making)   Patient presents for the above complaints.  On my evaluation he is alert, was initially sleeping but did arouse to answer questions.  He recognizes that he needs help with his chemical dependency.  His breathalyzer here in the ED is 0.237.  I have ordered an Accu-Chek on him which is 85, urine tox screen pending.  There is a bed  available so we will plan to admit him to detox voluntarily. Aura chaparro ordered.     I have reviewed the nursing notes.    I have reviewed the findings, diagnosis, plan and need for follow up with the patient.    New Prescriptions    No medications on file       Final diagnoses:   Acute alcoholic intoxication in alcoholism without complication (H)   Opiate abuse, episodic (H)       11/8/2018   [facility], [place], EMERGENCY DEPARTMENT     Jojo Pruett MD  11/08/18 1941

## 2018-11-09 NOTE — PROGRESS NOTES
11/08/18 2058   Patient Belongings   Did you bring any home meds/supplements to the hospital?  Yes   Disposition of meds  Other (see comment)   Patient Belongings other (see comments)   Disposition of Belongings Other (see comment)   Belongings Search Yes   Clothing Search Yes   Second Staff Lalo Delaney   General Info Comment See notes.    Storage Bin: Duffle bag, clothes, cap, jacket, cigarettes, lighter.  Discharge Bin: Cell phone.  Medical: CPAP Machine.   A             Admission:  I am responsible for any personal items that are not sent to the safe or pharmacy.  [facility] is not responsible for loss, theft or damage of any property in my possession.    Signature:  _________________________________ Date: _______  Time: _____                                              Staff Signature:  ____________________________ Date: ________  Time: _____      2nd Staff person, if patient is unable/unwilling to sign:    Signature: ________________________________ Date: ________  Time: _____     Discharge:  [facility] has returned all of my personal belongings:  Signature: _________________________________ Date: ________  Time: _____                                          Staff Signature:  ____________________________ Date: ________  Time: _____

## 2018-11-10 LAB
ALBUMIN SERPL-MCNC: 3.2 G/DL (ref 3.4–5)
ALP SERPL-CCNC: 89 U/L (ref 40–150)
ALT SERPL W P-5'-P-CCNC: 134 U/L (ref 0–70)
AST SERPL W P-5'-P-CCNC: 139 U/L (ref 0–45)
BILIRUB DIRECT SERPL-MCNC: 0.3 MG/DL (ref 0–0.2)
BILIRUB SERPL-MCNC: 1.1 MG/DL (ref 0.2–1.3)
PROT SERPL-MCNC: 6.4 G/DL (ref 6.8–8.8)

## 2018-11-10 PROCEDURE — 25000132 ZZH RX MED GY IP 250 OP 250 PS 637: Performed by: PSYCHIATRY & NEUROLOGY

## 2018-11-10 PROCEDURE — 25000132 ZZH RX MED GY IP 250 OP 250 PS 637: Performed by: NURSE PRACTITIONER

## 2018-11-10 PROCEDURE — 80076 HEPATIC FUNCTION PANEL: CPT | Performed by: PHYSICIAN ASSISTANT

## 2018-11-10 PROCEDURE — 12800012 ZZH R&B CD MH INTERMEDIATE ADULT

## 2018-11-10 PROCEDURE — 36415 COLL VENOUS BLD VENIPUNCTURE: CPT | Performed by: PHYSICIAN ASSISTANT

## 2018-11-10 RX ORDER — NICOTINE 21 MG/24HR
1 PATCH, TRANSDERMAL 24 HOURS TRANSDERMAL DAILY
Status: DISCONTINUED | OUTPATIENT
Start: 2018-11-10 | End: 2018-11-12 | Stop reason: HOSPADM

## 2018-11-10 RX ADMIN — Medication 100 MG: at 08:54

## 2018-11-10 RX ADMIN — FOLIC ACID 1 MG: 1 TABLET ORAL at 08:54

## 2018-11-10 RX ADMIN — HYDROXYZINE HYDROCHLORIDE 25 MG: 25 TABLET ORAL at 08:56

## 2018-11-10 RX ADMIN — FLUTICASONE PROPIONATE 1 SPRAY: 50 SPRAY, METERED NASAL at 08:53

## 2018-11-10 RX ADMIN — HYDROXYZINE HYDROCHLORIDE 25 MG: 25 TABLET ORAL at 17:01

## 2018-11-10 RX ADMIN — LISINOPRIL AND HYDROCHLOROTHIAZIDE 1 TABLET: 12.5; 1 TABLET ORAL at 08:54

## 2018-11-10 RX ADMIN — NICOTINE 1 PATCH: 21 PATCH, EXTENDED RELEASE TRANSDERMAL at 09:40

## 2018-11-10 RX ADMIN — THERA TABS 1 TABLET: TAB at 08:54

## 2018-11-10 RX ADMIN — HYDROXYZINE HYDROCHLORIDE 25 MG: 25 TABLET ORAL at 21:05

## 2018-11-10 RX ADMIN — ATORVASTATIN CALCIUM 40 MG: 40 TABLET, FILM COATED ORAL at 21:05

## 2018-11-10 RX ADMIN — DIAZEPAM 10 MG: 5 TABLET ORAL at 00:57

## 2018-11-10 ASSESSMENT — ACTIVITIES OF DAILY LIVING (ADL)
DRESS: INDEPENDENT
ORAL_HYGIENE: INDEPENDENT
LAUNDRY: WITH SUPERVISION
GROOMING: INDEPENDENT

## 2018-11-10 NOTE — PROGRESS NOTES
"Brief Medicine Note    US for LLE negative for DVT.    Repeat LFT's today stable, will trend again tomorrow.       BP (!) 136/92  Pulse 75  Temp 97.3  F (36.3  C) (Oral)  Resp 16  Ht 1.778 m (5' 10\")  Wt 99.2 kg (218 lb 9.6 oz)  SpO2 96%  BMI 31.37 kg/m2      Angela Thacker PA-C  Hospitalist Service  Pager [phone]      "

## 2018-11-10 NOTE — PLAN OF CARE
Problem: Substance Withdrawal  Goal: Substance Withdrawal  Signs and symptoms of listed problems will be absent or manageable.   Outcome: Improving  Pt presents with tense to full-range affect, reports mood is depressed and anxious, rates both at an 8 out of 10 (10 = worst). Pt is pleasant and cooperative in interactions and is visible in the milieu. Pt remains under EtOH withdrawal monitoring via MSSA scale with scores today of 6 and 5. Pt denies any thoughts of wanting to die or harm self. Pt has expressed interest in obtaining a yoga mat to do gentle exercises in his room. Writer directed pt to psych associate for assistance. No other concerns at this time. Nursing will continue to monitor and assess.

## 2018-11-11 LAB
ALBUMIN SERPL-MCNC: 3.3 G/DL (ref 3.4–5)
ALP SERPL-CCNC: 91 U/L (ref 40–150)
ALT SERPL W P-5'-P-CCNC: 330 U/L (ref 0–70)
AST SERPL W P-5'-P-CCNC: 366 U/L (ref 0–45)
BILIRUB DIRECT SERPL-MCNC: 0.2 MG/DL (ref 0–0.2)
BILIRUB SERPL-MCNC: 0.7 MG/DL (ref 0.2–1.3)
PROT SERPL-MCNC: 6.5 G/DL (ref 6.8–8.8)

## 2018-11-11 PROCEDURE — 80076 HEPATIC FUNCTION PANEL: CPT | Performed by: PHYSICIAN ASSISTANT

## 2018-11-11 PROCEDURE — 36415 COLL VENOUS BLD VENIPUNCTURE: CPT | Performed by: PHYSICIAN ASSISTANT

## 2018-11-11 PROCEDURE — 25000132 ZZH RX MED GY IP 250 OP 250 PS 637: Performed by: PSYCHIATRY & NEUROLOGY

## 2018-11-11 PROCEDURE — 25000132 ZZH RX MED GY IP 250 OP 250 PS 637: Performed by: NURSE PRACTITIONER

## 2018-11-11 PROCEDURE — 12800012 ZZH R&B CD MH INTERMEDIATE ADULT

## 2018-11-11 RX ADMIN — HYDROXYZINE HYDROCHLORIDE 25 MG: 25 TABLET ORAL at 22:17

## 2018-11-11 RX ADMIN — THERA TABS 1 TABLET: TAB at 09:08

## 2018-11-11 RX ADMIN — LISINOPRIL AND HYDROCHLOROTHIAZIDE 1 TABLET: 12.5; 1 TABLET ORAL at 09:08

## 2018-11-11 RX ADMIN — MAGNESIUM HYDROXIDE 30 ML: 400 SUSPENSION ORAL at 20:09

## 2018-11-11 RX ADMIN — HYDROXYZINE HYDROCHLORIDE 25 MG: 25 TABLET ORAL at 16:38

## 2018-11-11 RX ADMIN — Medication 100 MG: at 09:08

## 2018-11-11 RX ADMIN — NICOTINE 1 PATCH: 21 PATCH, EXTENDED RELEASE TRANSDERMAL at 09:08

## 2018-11-11 RX ADMIN — ATORVASTATIN CALCIUM 40 MG: 40 TABLET, FILM COATED ORAL at 20:07

## 2018-11-11 RX ADMIN — FLUTICASONE PROPIONATE 1 SPRAY: 50 SPRAY, METERED NASAL at 12:15

## 2018-11-11 RX ADMIN — FOLIC ACID 1 MG: 1 TABLET ORAL at 09:09

## 2018-11-11 ASSESSMENT — ACTIVITIES OF DAILY LIVING (ADL)
DRESS: INDEPENDENT
GROOMING: HANDWASHING;INDEPENDENT
ORAL_HYGIENE: INDEPENDENT

## 2018-11-11 NOTE — PLAN OF CARE
"Problem: Substance Withdrawal  Goal: Social and Therapeutic (Substance Withdrawal)  Signs and symptoms of listed problems will be absent or manageable.   Outcome: Improving  Pt presents with tense to full-range affect, poor hygiene, reports mood is \"ok\" denies SI,  Anxious 8 out of 10 (10 = worst). Pt is pleasant and cooperative in interactions and is visible in the milieu. He reports that he is working on his CD assessment paperwork and is undecided if he would like to do inpatient or outpatient treatment.  I explained that he needs to complete the paperwork and CM will do an assessment and make a recommendation. Will have repeat labs 11/12/18,  LFT's remain elevated.      "

## 2018-11-11 NOTE — PROGRESS NOTES
Patient has not required any valium for alcohol detox since 11/10/18 @ 0057. All MSSA scores since that time have been less than 8. Pt is now removed from alcohol detox monitoring status. Await disposition likely to in or outpatient treatment.

## 2018-11-12 VITALS
TEMPERATURE: 97.5 F | OXYGEN SATURATION: 96 % | HEIGHT: 70 IN | WEIGHT: 218.6 LBS | BODY MASS INDEX: 31.3 KG/M2 | HEART RATE: 79 BPM | RESPIRATION RATE: 16 BRPM | DIASTOLIC BLOOD PRESSURE: 76 MMHG | SYSTOLIC BLOOD PRESSURE: 115 MMHG

## 2018-11-12 LAB
ALBUMIN SERPL-MCNC: 3.4 G/DL (ref 3.4–5)
ALP SERPL-CCNC: 104 U/L (ref 40–150)
ALT SERPL W P-5'-P-CCNC: 303 U/L (ref 0–70)
AST SERPL W P-5'-P-CCNC: 218 U/L (ref 0–45)
BILIRUB DIRECT SERPL-MCNC: 0.1 MG/DL (ref 0–0.2)
BILIRUB SERPL-MCNC: 0.4 MG/DL (ref 0.2–1.3)
HBV SURFACE AB SERPL IA-ACNC: 0 M[IU]/ML
HBV SURFACE AG SERPL QL IA: NONREACTIVE
HCV AB SERPL QL IA: NONREACTIVE
PROT SERPL-MCNC: 6.9 G/DL (ref 6.8–8.8)

## 2018-11-12 PROCEDURE — 36415 COLL VENOUS BLD VENIPUNCTURE: CPT | Performed by: PHYSICIAN ASSISTANT

## 2018-11-12 PROCEDURE — 80076 HEPATIC FUNCTION PANEL: CPT | Performed by: PHYSICIAN ASSISTANT

## 2018-11-12 PROCEDURE — 99239 HOSP IP/OBS DSCHRG MGMT >30: CPT | Performed by: PSYCHIATRY & NEUROLOGY

## 2018-11-12 PROCEDURE — 25000132 ZZH RX MED GY IP 250 OP 250 PS 637: Performed by: PSYCHIATRY & NEUROLOGY

## 2018-11-12 PROCEDURE — 86706 HEP B SURFACE ANTIBODY: CPT | Performed by: PHYSICIAN ASSISTANT

## 2018-11-12 PROCEDURE — G0472 HEP C SCREEN HIGH RISK/OTHER: HCPCS | Performed by: PHYSICIAN ASSISTANT

## 2018-11-12 PROCEDURE — 25000132 ZZH RX MED GY IP 250 OP 250 PS 637: Performed by: NURSE PRACTITIONER

## 2018-11-12 PROCEDURE — G0499 HEPB SCREEN HIGH RISK INDIV: HCPCS | Performed by: PHYSICIAN ASSISTANT

## 2018-11-12 RX ORDER — HYDROXYZINE HYDROCHLORIDE 25 MG/1
25 TABLET, FILM COATED ORAL EVERY 4 HOURS PRN
Qty: 120 TABLET | Refills: 0 | Status: SHIPPED | OUTPATIENT
Start: 2018-11-12

## 2018-11-12 RX ORDER — MULTIVITAMIN,THERAPEUTIC
1 TABLET ORAL DAILY
Qty: 30 TABLET | Refills: 0 | Status: SHIPPED | OUTPATIENT
Start: 2018-11-12

## 2018-11-12 RX ORDER — LANOLIN ALCOHOL/MO/W.PET/CERES
100 CREAM (GRAM) TOPICAL DAILY
Qty: 30 TABLET | Refills: 0 | Status: SHIPPED | OUTPATIENT
Start: 2018-11-12

## 2018-11-12 RX ORDER — LISINOPRIL/HYDROCHLOROTHIAZIDE 10-12.5 MG
1 TABLET ORAL DAILY
Qty: 30 TABLET | Refills: 0 | Status: SHIPPED | OUTPATIENT
Start: 2018-11-12

## 2018-11-12 RX ORDER — FLUTICASONE PROPIONATE 50 MCG
1 SPRAY, SUSPENSION (ML) NASAL DAILY
Qty: 1 BOTTLE | Refills: 0 | Status: SHIPPED | OUTPATIENT
Start: 2018-11-12

## 2018-11-12 RX ORDER — NICOTINE 21 MG/24HR
1 PATCH, TRANSDERMAL 24 HOURS TRANSDERMAL DAILY
Qty: 30 PATCH | Refills: 1 | Status: SHIPPED | OUTPATIENT
Start: 2018-11-12

## 2018-11-12 RX ADMIN — NICOTINE 1 PATCH: 21 PATCH, EXTENDED RELEASE TRANSDERMAL at 09:09

## 2018-11-12 RX ADMIN — Medication 100 MG: at 09:09

## 2018-11-12 RX ADMIN — FOLIC ACID 1 MG: 1 TABLET ORAL at 09:09

## 2018-11-12 RX ADMIN — THERA TABS 1 TABLET: TAB at 09:08

## 2018-11-12 RX ADMIN — LISINOPRIL AND HYDROCHLOROTHIAZIDE 1 TABLET: 12.5; 1 TABLET ORAL at 09:09

## 2018-11-12 RX ADMIN — FLUTICASONE PROPIONATE 1 SPRAY: 50 SPRAY, METERED NASAL at 09:16

## 2018-11-12 RX ADMIN — HYDROXYZINE HYDROCHLORIDE 25 MG: 25 TABLET ORAL at 12:13

## 2018-11-12 NOTE — DISCHARGE SUMMARY
More than 35 minutes spent on this discharge summary doing discharge instructions, discharge medications and discharge mental status examination.      DISCHARGE DIAGNOSIS:   AXIS I:  Alcohol use, severe.  Major depressive disorder, psychotic features.      HOSPITAL COURSE:  During the hospitalization, the patient was detoxed from alcohol with MSSA protocol and Valium.  The patient was seen by Internal Medicine consult by Angela Thacker PA-C on 11/10.  The patient had an ultrasound done of his left lower extremity and it was negative for DVT.  He was seen by Katelin Shetty PA-C on 11/09/2018.  The patient had elevated transaminitis.  ALT went from 137 to 303 and AST went from 133 to 218.  His protein was 6.5, MCV was 109.  During the hospitalization, the patient told me that he was not taking simvastatin.  I spoke with medicine.  Medicine feels that simvastatin could cause him to have elevated liver tests and they took him off it.  During the hospitalization, the patient's energy, motivation, sleep, and interest improved.  The patient did not have any active suicidal ideation, plan, or intent.  His energy, motivation, sleep, and interest improved.      DISCHARGE DISPOSITION:  The patient is going home.  Follow up with Dr. Leonardo.  The patient has met with the  and the patient's plan is to go  11/13/18 to [facility].  The patient needs to be medically cleared.  I was going to start him on Wellbutrin, but given his liver enzymes are elevated he will pick this up with his primary care treatment program.               DISCHARGE MENTAL STATUS EXAMINATION:  The patient is alert, oriented x3.  Good fund of knowledge.  Good use of language.  Recent and remote memory, language, fund of knowledge are all adequate.  Euthymic mood congruent affect  Speech normal rate/rhythm linear tp no loose asso,The patient does not have any active suicidal or homicidal ideation.  Does not have any auditory  or visual hallucination.  Fair insight/judgment At this time, the patient was stable to be discharged.        Pt was not determined to not be a danger to himself or others. At the current time of discharge, the patient does not meet criteria for involuntary hospitalization. On the day of discharge, the patient reports that they do not have suicidal or homicidal ideation and would never hurt themselves or others. Steps taken to minimize risk include: assessing patient s behavior and thought process daily during hospital stay, discharging patient with adequate plan for follow up for mental and physical health and discussing safety plan of returning to the hospital should the patient ever have thoughts of harming themselves or others. Therefore, based on all available evidence including the factors cited above, the patient does not appear to be at imminent risk for self-harm, and is appropriate for outpatient level of care.     Educated about side effects/risk vs benefits /alternative including non treatment.Pt consented to be on medication.     .Total time spent on discharge summary more than 35 min  More than  20 min  planning, coordination of care, medication reconciliation and performance of physical exam on day of discharge.Care was coordinated with unit RN and unit therapist       Esa Yoo   Home Medication Instructions BRENT:[id number]    Printed on:11/13/18 9674   Medication Information                      fluticasone (FLONASE) 50 MCG/ACT spray  Spray 1 spray into both nostrils daily             hydrOXYzine (ATARAX) 25 MG tablet  Take 1 tablet (25 mg) by mouth every 4 hours as needed for anxiety             lisinopril-hydrochlorothiazide (PRINZIDE/ZESTORETIC) 10-12.5 MG per tablet  Take 1 tablet by mouth daily             multivitamin, therapeutic (THERA-VIT) TABS tablet  Take 1 tablet by mouth daily             nicotine (NICODERM CQ) 21 MG/24HR 24 hr patch  Place 1 patch onto the skin daily              thiamine 100 MG tablet  Take 1 tablet (100 mg) by mouth daily             Disposition: [facility], [address] [facility] [address].   @ 1:00 pm.  [phone]     Primary Provider:  Dr. Leonardo  [facility]  Address: [address], [place] [address]  Phone: [phone]     Appointment:  18 at 2:20pm     KY HOUSER MD             D: 2018   T: 2018   MT: LENARD      Name:     YOMAIRA VERA   MRN:      [id number]        Account:        [id number]   :      1965           Admit Date:     2018                                  Discharge Date:       Document: [id number]

## 2018-11-12 NOTE — PROGRESS NOTES
Brief Medicine Note    Medicine following for transaminitis.    Repeat hepatic panel this AM with slight improvement -  (366),  (330), alk phos and T bili WNL. Obtained hepatitis B/C labs which are still pending. At this time, suspect transaminitis 2/2 alcohol use and reassuring that liver enzymes appear to have peaked. No need to trend further while inpatient, recommend following up with PCP in 1 week for repeat CMP.     Medicine will continue to follow for hepatitis panel results. Please page the internal medicine job code pager with any intercurrent medical concerns that arise.    Amber Horner PA-C  Hospitalist Service  [phone]

## 2018-11-12 NOTE — PROGRESS NOTES
"Pt discharged to home with all belongings and medications.  Acknowledged understanding of all discharge instructions. Picked up by \"Girlfriend,\" walked off unit by Brannon KESSLER.    "

## 2018-11-12 NOTE — DISCHARGE INSTRUCTIONS
Behavioral Discharge Planning and Instructions  THANK YOU FOR CHOOSING THE [address]  [phone]    Summary: You were admitted to [facility] on 11/8/18 for detoxification from Alcohol.  A medical exam was performed that included lab work. You have met with a  and opted to pursue OP treatment in [facility].  You are scheduled for an intake assessment with [facility] at the time and place indicated below.  Please take care and make your recovery a priority!      [facility],  [address] , [facility] [address].    November 13th @ 1:00 pm.    [phone]    Main Diagnosis:  Per  Dr. Victoria  Alcohol use disorder, severe.   Major depressive disorder, recurrent with psychotic features.           Major Treatments, Procedures and Findings:  You were detoxed from alcohol. You have met with a  to develop a treatment plan for discharge.  You have had labs drawn and a copy of those labs will be sent home with you. Your alcohol withdrawal is complete and you are being discharged today.  Please bring your lab results with to your follow up doctor appointment.  Make your recovery a priority!                        Symptoms to Report:  If you experience more anxiety, confusion, sleeplessness, deep sadness or thoughts of suicide, notify your treatment team or notify your primary care physician. IF ANY OF THE SYMPTOMS YOU ARE EXPERIENCING ARE A MEDICAL EMERGENCY CALL 911 IMMEDIATELY.     Lifestyle Adjustment: Adjust your lifestyle to get enough sleep, relaxation, exercise and  good nutrition. Continue to develop healthy coping skills to decrease stress and promote a sober living environment. Do not use alcohol, illegal drugs or addictive medications other than what is currently prescribed. AA, NA, and  Sponsor are excellent resources for support.     Disposition: [facility],  [address] [facility] [address].  November 13th @ 1:00 pm.  [phone]    Primary Provider:  Dr. Leonardo  [facility] [place]  Address: [address], [place] [address]  Phone: [phone]    Appointment:  11/14/18 at 2:20pm    Resources:     [facility] [phone]    Support Group:  AA/NA and Sponsor/support    Crisis Intervention: 169.829.5000 or 014-060-1265 (TTY: 524.990.1033).  Call anytime for help.  National Philipp on Mental Illness (www.[place].shirley.org): 309.903.9194 or 059-891-6663.  Alcoholics Anonymous (www.alcoholics-anonymous.org): Check your phone book for your local chapter.  Suicide Awareness Voices of Education (SAVE) (www.save.org): 235-075-TQFZ (6993)  National Suicide Prevention Line (www.mentalhealthmn.org): 553-702-SNJU (7059)  Mental Health Consumer/Survivor Network of [place] (www.mhcsn.net): 712.471.8035 or 306-861-2357  Mental Health Association of [place] (www.mentalhealth.org): 838.477.7136 or 184-937-7871   Substance Abuse and Mental Health Services (www.samhsa.gov)    [facility] ([facility])  [facility] connects people seeking recovery to resources that help foster and sustain long-term recovery.  Whether you are seeking resources for treatment, transportation, housing, job training, education, health care or other pathways to recovery, [facility] is a great place to start.  523.616.8147.  [facility]org    General Medication Instructions:   See your medication sheet(s) for instructions.   Take all medicines as directed.  Make no changes unless your doctor suggests them.   Go to all your doctor visits.  Be sure to have all your required lab tests. This way, your medicines can be refilled on time.  Do not use any drugs not prescribed by your provider.  AA/NA and Sponsors are excellent resources for support  Avoid alcohol.    Please Note:  If you have any questions at anytime after you are discharged please call the [facility]  [facility] detox unit 3AW unit at [phone].    [facility], Behavioral Intake [phone]    Please take this discharge folder with you to all your follow up appointments, it contains your lab results, diagnosis, medication list and discharge recommendations.      THANK YOU FOR CHOOSING THE [facility]

## 2018-11-12 NOTE — PROGRESS NOTES
Pt is scheduled for intake assessment at [facility] on 11/13/18 @ 1:00 pm.  Case managmeent complete.